# Patient Record
Sex: MALE | Race: WHITE | Employment: FULL TIME
[De-identification: names, ages, dates, MRNs, and addresses within clinical notes are randomized per-mention and may not be internally consistent; named-entity substitution may affect disease eponyms.]

---

## 2022-02-11 ENCOUNTER — TRANSCRIBE ORDERS (OUTPATIENT)
Dept: OTHER | Age: 37
End: 2022-02-11

## 2022-02-11 DIAGNOSIS — I71.03 DISSECTION OF THORACOABDOMINAL AORTA (H): Primary | ICD-10-CM

## 2022-02-21 ENCOUNTER — MEDICAL CORRESPONDENCE (OUTPATIENT)
Dept: HEALTH INFORMATION MANAGEMENT | Facility: CLINIC | Age: 37
End: 2022-02-21
Payer: COMMERCIAL

## 2022-02-25 ENCOUNTER — TRANSCRIBE ORDERS (OUTPATIENT)
Dept: OTHER | Age: 37
End: 2022-02-25

## 2022-02-25 DIAGNOSIS — I71.00 DISSECTION OF AORTA, UNSPECIFIED PORTION OF AORTA (H): Primary | ICD-10-CM

## 2022-03-01 ENCOUNTER — TELEPHONE (OUTPATIENT)
Dept: CONSULT | Facility: CLINIC | Age: 37
End: 2022-03-01
Payer: COMMERCIAL

## 2022-03-01 NOTE — TELEPHONE ENCOUNTER
[facility] Call Center    Phone Message    May a detailed message be left on voicemail: yes     Reason for Call: Other: Intake form     Patient called back and scheduled. He declined MyChart.  I have added an email address to the demographics in case the intake form needs to be emailed to him instead.     Action Taken: Message routed to:  Other: Peds Genetics    Travel Screening: Not Applicable

## 2022-03-01 NOTE — TELEPHONE ENCOUNTER
LVM for patient to call back to schedule new patient appointment with any available Genetics MD (excluding Dr. Garces) with GC visit prior. When patient calls back, please assist in scheduling appointment. If scheduling with Dr. Nguyễn, schedule in person visit, otherwise video or in person visit OK but please inform patient that appointment type may be changed at provider's discretion. Please advise patient to complete intake form via [employer]. Thank you.

## 2022-04-08 ENCOUNTER — TRANSCRIBE ORDERS (OUTPATIENT)
Dept: OTHER | Age: 37
End: 2022-04-08
Payer: COMMERCIAL

## 2022-04-08 DIAGNOSIS — Z98.890 S/P AORTIC DISSECTION REPAIR: Primary | ICD-10-CM

## 2022-04-11 ENCOUNTER — OFFICE VISIT (OUTPATIENT)
Dept: CONSULT | Facility: CLINIC | Age: 37
End: 2022-04-11
Attending: PEDIATRICS
Payer: COMMERCIAL

## 2022-04-11 VITALS
SYSTOLIC BLOOD PRESSURE: 167 MMHG | WEIGHT: 231.7 LBS | HEART RATE: 76 BPM | HEIGHT: 72 IN | BODY MASS INDEX: 31.38 KG/M2 | DIASTOLIC BLOOD PRESSURE: 102 MMHG

## 2022-04-11 DIAGNOSIS — I71.00 AORTIC DISSECTION (H): ICD-10-CM

## 2022-04-11 DIAGNOSIS — Z92.81 PERSONAL HISTORY OF ECMO: ICD-10-CM

## 2022-04-11 DIAGNOSIS — I71.00 DISSECTION OF AORTA, UNSPECIFIED PORTION OF AORTA (H): ICD-10-CM

## 2022-04-11 DIAGNOSIS — Z98.890 HX OF REPAIR OF DISSECTING ANEURYSM OF ASCENDING THORACIC AORTA: ICD-10-CM

## 2022-04-11 DIAGNOSIS — Z86.79 HX OF REPAIR OF DISSECTING ANEURYSM OF ASCENDING THORACIC AORTA: ICD-10-CM

## 2022-04-11 DIAGNOSIS — Z71.83 ENCOUNTER FOR NONPROCREATIVE GENETIC COUNSELING: Primary | ICD-10-CM

## 2022-04-11 PROCEDURE — G0463 HOSPITAL OUTPT CLINIC VISIT: HCPCS

## 2022-04-11 PROCEDURE — 99205 OFFICE O/P NEW HI 60 MIN: CPT | Performed by: PEDIATRICS

## 2022-04-11 PROCEDURE — 36415 COLL VENOUS BLD VENIPUNCTURE: CPT | Performed by: GENETIC COUNSELOR, MS

## 2022-04-11 PROCEDURE — 96040 HC GENETIC COUNSELING, EACH 30 MINUTES: CPT | Performed by: GENETIC COUNSELOR, MS

## 2022-04-11 RX ORDER — FERROUS SULFATE 325(65) MG
TABLET ORAL
COMMUNITY

## 2022-04-11 RX ORDER — ASPIRIN 81 MG/1
TABLET, CHEWABLE ORAL
COMMUNITY
Start: 2022-02-18

## 2022-04-11 RX ORDER — METOPROLOL TARTRATE 50 MG
TABLET ORAL
COMMUNITY
Start: 2022-03-17

## 2022-04-11 RX ORDER — APIXABAN 5 MG/1
TABLET, FILM COATED ORAL
COMMUNITY
Start: 2022-03-25

## 2022-04-11 RX ORDER — AMLODIPINE BESYLATE 5 MG/1
TABLET ORAL
COMMUNITY
Start: 2022-02-21

## 2022-04-11 RX ORDER — AMOXICILLIN 500 MG/1
CAPSULE ORAL
COMMUNITY
Start: 2022-02-23

## 2022-04-11 RX ORDER — AMIODARONE HYDROCHLORIDE 200 MG/1
200 TABLET ORAL
COMMUNITY
Start: 2022-03-15

## 2022-04-11 RX ORDER — LOSARTAN POTASSIUM 25 MG/1
TABLET ORAL
COMMUNITY
Start: 2022-04-06

## 2022-04-11 RX ORDER — ACETAMINOPHEN 80 MG/1
TABLET, CHEWABLE ORAL
COMMUNITY

## 2022-04-11 NOTE — PROGRESS NOTES
Name:  Vinny Russell  :   1985  MRN:   [id number]  Date of service: 2022  Primary Provider: Summer Trevino  Referring Provider: Summer Trevino    Presenting Information:  Vinny, a 36 year old male, was seen at the [facility] for evaluation of aortic dissection. I met with Vinny at the request of Dr. Lambert to obtain a family history, discuss possible genetic contributions to his symptoms, and to obtain informed consent for genetic testing.       Family History:   A three generation pedigree was obtained today and scanned into the EMR. 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 Refer to Dr. Lambert's note for a more detailed personal history.   Children:    Son (4) is well.    Vinny's partner is currently pregnant with a son. No prenatal complications noted.   Siblings:    Sister (42) has high cholesterol. She has 3 children who are well.  Maternal Relatives:    Mother (72) has high cholesterol and arthritis.    Uncle had heart bypass surgery.    Uncle had heart bypass surgery.    Grandmother passed away at age 98 from old age.     Grandfather passed away at age 50 from a heart attack.   Paternal Relatives:    Father (68) has high cholesterol and mental health concerns.    Aunt passed away at age 60 from skin cancer.    Aunt passed away at age 65 from substance dependency complications.    Aunt is getting treatment for a jaw cyst.    Grandmother passed away in her late 80s from Alzheimer's/old age.     Grandfather passed away at age 80 from a stroke. He had high cholesterol and diabetes.     The family history is  otherwise negative for aortic root dilation, aneurysm, high degree of myopia, ectopia lentis, retinal detachment, pneumothorax, scoliosis, other skeletal concerns, organ rupture, sudden death, and known genetic disorders. Consanguinity is denied.      Discussion and Assessment:  Genes are long stretches of DNA that are responsible for how our bodies look and how our bodies work.  Our genes are inherited on structures called chromosomes of which we have 23 pairs.  One copy of each chromosome in a pair is inherited from the mother and one is inherited from the father.  Changes in the DNA sequence of a gene, called mutations or pathogenic variants, as well as changes within the chromosomes can cause the signs and symptoms of a genetic condition.     Aortic dilation/aneurysm occurs when a portion of the aorta, a large blood vessel in the heart, becomes enlarged due to weakening of the artery wall.  Depending on the size of the aneurysm, there may be a risk for the aneurysm to break open and rupture, also known as an aortic dissection.  Aortic aneurysms can be caused by disruptions or mutations in a number of genes, which can be inherited within families.  Aortic aneurysms can be the only symptom caused by the gene disruption, such as in the genetic condition, familial thoracic aortic aneurysm and/or dissection (TAAD).  Aortic aneurysms can also occur as part of a syndrome that includes other symptoms or physical features.  These syndromes are known as connective tissue disorders because the genes involved make connective tissue proteins found in joints, organs, and blood vessels.  One of these syndromes is called Marfan syndrome.  In addition to aortic aneurysms, other features include tall stature, slender limbs, long arm span, scoliosis, and lens dislocation.  Examples of two other connective tissue disorders are Loeys-Rachel syndrome and Luis-Danlos syndrome. Aortic aneurysm can be found in association with genetic  syndromes, such as Marfan and Loeys-Rachel syndrome or as an isolated event that runs in families. 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    Genetic testing is available for thoracic aortic aneurysms. The testing panel includes 29 genes associated with both syndromic and isolated aortic aneurysms. Testing currently identifies pathogenic variants in about 20% of individuals with TAAD. Diagnostic yield is higher for syndromic individuals. The potential results were discussed including a positive, negative, or variant of uncertain significance.       One possibility is a change(s) could be seen in Vinny and this change(s) is known to cause similar symptoms to the symptoms Vinny has experienced.  This is considered a positive result.  A positive result may provide more information on appropriate clinical management for Vinny and may provide information on additional potential health risks associated with Vinny's diagnosis.  A positive result can also have implications for the health and reproductive risks of other relatives.    It is also possible that no change(s) that are likely to explain Vinny's symptoms are found.  This is considered a negative result.  A negative result would not completely rule out a possible genetic cause for Vinny's symptoms.    Not all changes in our genes cause disease.  Sometimes, it can be difficult for the laboratory to determine whether or not a change that is found contributes to the patient's symptoms.  If the meaning of a particular gene change is unknown, the lab classifies the result as a variant of unknown significance (VUS). Follow-up testing of relatives may be beneficial in clarifying the meaning of this result.     It is medically necessary to determine if there is an underlying genetic cause for Vinny's symptoms for several reasons. First and  foremost this can be important for his own health. It is possible that an underlying cause may also predispose Vinny to other health risks. Knowing about these additional health risks can help us stay ahead of Vinny's healthcare to more appropriately screen for other complications.  Some diagnoses may also have treatment options. Additionally, discovering an underlying reason may help predict the chance for other family members to have similar healthcare needs. Finally, having a specific underlying diagnosis can sometimes help individuals receive the services they need to help reach their full potential in school, in work, or in day to day life.     Insurance and billing procedures were covered with Vinny. The lab we are recommending using for this test is called [facility]. Once [facility] receives the sample, they will do a benefits investigation and contact Vinny with an estimated out of pocket cost if expected to be more than $100. This estimation is not guaranteed. At that time, Vinny has the right to decline to proceed with testing based on the benefits investigation. If [facility] does not hear back from Vinny after three attempts to connect, testing will be canceled. If the benefits investigation is too high for Vinny [facility] offers financial assistance based on house-hold income and household size. he may also switch to the patient-pay price. If the estimation of benefits is less than $100, Vinny will not be contacted and testing will be automatically initiated.     Vinny provided informed consent for the testing. I will plan to follow-up with Vinny by phone when results are returned, approximately 2-3 weeks after the testing is initiated. A follow-up appointment will be scheduled as needed according to Dr. Lambert. Additional questions or concerns were denied at this time.        Plan:  1. Vinny had his blood drawn for a Aortopathy Comprehensive Panel at [facility] today. Once the lab receives the sample,  they will conduct a benefits investigation with [email or web address] insurance and he will be contacted about the outcome.     2. If he want to proceed at that time, Vinny's genetic test will be initiated. If the estimation is less than $100, he will not be contacted and testing will begin automatically.    3. Results are expected in approximately 2-3 weeks and will be returned by phone; a follow-up appointment will be scheduled as needed at that time.    4. Contact information was provided should any questions arise in the future.        Esperanza Joyner MS, [facility]  Genetic Counselor  [facility]   Phone: [phone]        Approximate Time Spent in Consultation: 20 min     CC: no letter

## 2022-04-11 NOTE — LETTER
2022      RE: Vinny Russell  [address]  [place] [address]       Name:  Vinny Russell  :   1985  MRN:   [id number]  Date of service: 2022  Primary Provider: Summer Trevino  Referring Provider: Summer Trevino    Presenting Information:  Vinny, a 36 year old male, was seen at the [facility] for evaluation of aortic dissection. I met with Vinny at the request of Dr. Lambert to obtain a family history, discuss possible genetic contributions to his symptoms, and to obtain informed consent for genetic testing.       Family History:   A three generation pedigree was obtained today and scanned into the EMR. 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 Refer to Dr. Lambert's note for a more detailed personal history.   Children:    Son (4) is well.    Vinny's partner is currently pregnant with a son. No prenatal complications noted.   Siblings:    Sister (42) has high cholesterol. She has 3 children who are well.  Maternal Relatives:    Mother (72) has high cholesterol and arthritis.    Uncle had heart bypass surgery.    Uncle had heart bypass surgery.    Grandmother passed away at age 98 from old age.     Grandfather passed away at age 50 from a heart attack.   Paternal Relatives:    Father (68) has high cholesterol and mental health concerns.    Aunt passed away at age 60 from skin cancer.    Aunt passed away at age 65 from substance dependency complications.    Aunt is getting treatment for a jaw cyst.    Grandmother passed away in her late 80s from Alzheimer's/old age.     Grandfather passed away at age  80 from a stroke. He had high cholesterol and diabetes.     The family history is otherwise negative for aortic root dilation, aneurysm, high degree of myopia, ectopia lentis, retinal detachment, pneumothorax, scoliosis, other skeletal concerns, organ rupture, sudden death, and known genetic disorders. Consanguinity is denied.      Discussion and Assessment:  Genes are long stretches of DNA that are responsible for how our bodies look and how our bodies work.  Our genes are inherited on structures called chromosomes of which we have 23 pairs.  One copy of each chromosome in a pair is inherited from the mother and one is inherited from the father.  Changes in the DNA sequence of a gene, called mutations or pathogenic variants, as well as changes within the chromosomes can cause the signs and symptoms of a genetic condition.     Aortic dilation/aneurysm occurs when a portion of the aorta, a large blood vessel in the heart, becomes enlarged due to weakening of the artery wall.  Depending on the size of the aneurysm, there may be a risk for the aneurysm to break open and rupture, also known as an aortic dissection.  Aortic aneurysms can be caused by disruptions or mutations in a number of genes, which can be inherited within families.  Aortic aneurysms can be the only symptom caused by the gene disruption, such as in the genetic condition, familial thoracic aortic aneurysm and/or dissection (TAAD).  Aortic aneurysms can also occur as part of a syndrome that includes other symptoms or physical features.  These syndromes are known as connective tissue disorders because the genes involved make connective tissue proteins found in joints, organs, and blood vessels.  One of these syndromes is called Marfan syndrome.  In addition to aortic aneurysms, other features include tall stature, slender limbs, long arm span, scoliosis, and lens dislocation.  Examples of two other connective tissue disorders are Loeys-Rachel syndrome and  Luis-Danlos syndrome. Aortic aneurysm can be found in association with genetic syndromes, such as Marfan and Loeys-Rachel syndrome or as an isolated event that runs in families. 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    Genetic testing is available for thoracic aortic aneurysms. The testing panel includes 29 genes associated with both syndromic and isolated aortic aneurysms. Testing currently identifies pathogenic variants in about 20% of individuals with TAAD. Diagnostic yield is higher for syndromic individuals. The potential results were discussed including a positive, negative, or variant of uncertain significance.       One possibility is a change(s) could be seen in Vinny and this change(s) is known to cause similar symptoms to the symptoms Vinny has experienced.  This is considered a positive result.  A positive result may provide more information on appropriate clinical management for Vinny and may provide information on additional potential health risks associated with Vinny's diagnosis.  A positive result can also have implications for the health and reproductive risks of other relatives.    It is also possible that no change(s) that are likely to explain Vinny's symptoms are found.  This is considered a negative result.  A negative result would not completely rule out a possible genetic cause for Vinny's symptoms.    Not all changes in our genes cause disease.  Sometimes, it can be difficult for the laboratory to determine whether or not a change that is found contributes to the patient's symptoms.  If the meaning of a particular gene change is unknown, the lab classifies the result as a variant of unknown significance (VUS). Follow-up testing of relatives may be beneficial in clarifying the meaning of this result.     It is medically necessary to determine if there is an  underlying genetic cause for Vinny's symptoms for several reasons. First and foremost this can be important for his own health. It is possible that an underlying cause may also predispose Vinny to other health risks. Knowing about these additional health risks can help us stay ahead of Vinny's healthcare to more appropriately screen for other complications.  Some diagnoses may also have treatment options. Additionally, discovering an underlying reason may help predict the chance for other family members to have similar healthcare needs. Finally, having a specific underlying diagnosis can sometimes help individuals receive the services they need to help reach their full potential in school, in work, or in day to day life.     Insurance and billing procedures were covered with Vinny. The lab we are recommending using for this test is called [facility]. Once [facility] receives the sample, they will do a benefits investigation and contact Vinny with an estimated out of pocket cost if expected to be more than $100. This estimation is not guaranteed. At that time, Vinny has the right to decline to proceed with testing based on the benefits investigation. If Pete does not hear back from Vinny after three attempts to connect, testing will be canceled. If the benefits investigation is too high for Vinny [facility] offers financial assistance based on house-hold income and household size. he may also switch to the patient-pay price. If the estimation of benefits is less than $100, Vinny will not be contacted and testing will be automatically initiated.     Vinny provided informed consent for the testing. I will plan to follow-up with Vinny by phone when results are returned, approximately 2-3 weeks after the testing is initiated. A follow-up appointment will be scheduled as needed according to Dr. Lambert. Additional questions or concerns were denied at this time.        Plan:  1. Vinny had his blood drawn for a  Aortopathy Comprehensive Panel at [facility] today. Once the lab receives the sample, they will conduct a benefits investigation with [employer] insurance and he will be contacted about the outcome.     2. If he want to proceed at that time, Vinyn's genetic test will be initiated. If the estimation is less than $100, he will not be contacted and testing will begin automatically.    3. Results are expected in approximately 2-3 weeks and will be returned by phone; a follow-up appointment will be scheduled as needed at that time.    4. Contact information was provided should any questions arise in the future.        Esperanza Joyner MS, [facility]  Genetic Counselor  [facility]   Phone: [phone]        Approximate Time Spent in Consultation: 20 min     CC: no letter      Esperanza Joyner

## 2022-04-11 NOTE — NURSING NOTE
"Chief Complaint   Patient presents with     Consult     S/P aortic dissection        Pulse 74   Ht 5' 11.89\" (182.6 cm)   Wt 231 lb 11.3 oz (105.1 kg)   HC 60.6 cm (23.86\")   BMI 31.52 kg/m      Concha Bryan  April 11, 2022  "

## 2022-04-11 NOTE — PATIENT INSTRUCTIONS
Genetics  [facility]     Contact our nurse care coordinator Joselin ROLANDN, RN, PHN at [phone] or send a [employer] message for any non-urgent general or medical questions.     If you had genetic testing and have further questions, please contact the genetic counselor:    Esperanza Joyner  Ph: [phone]    To schedule appointments:  Pediatric Call Center for [facility]: [phone]  Neuropsychology [phone]   Radiology/ Imaging/Echocardiogram: [phone]   Services:   [phone]     You should receive a phone call about your next appointment. If you do not receive this within two weeks of your visit, please call [phone].     IF REFERRALS WERE PLACED/ DISCUSSED DURING THE VISIT, PLEASE LET OUR TEAM KNOW IF YOU DO NOT HEAR FROM THE SCHEDULERS IN 2 WEEKS    If you have not already done so consider signing up for appsplit by speaking with the person at the  on your way out or go to [email or web address] to sign up online.     appsplit enables easy and confidential communication with your care team.

## 2022-04-11 NOTE — LETTER
Date:April 13, 2022      Provider requested that no letter be sent. Do not send.       [facility]
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
## 2022-04-12 PROBLEM — Z92.81 PERSONAL HISTORY OF ECMO: Status: ACTIVE | Noted: 2022-04-12

## 2022-04-12 PROBLEM — I71.00 AORTIC DISSECTION (H): Status: ACTIVE | Noted: 2022-01-03

## 2022-04-12 PROBLEM — I48.92 ATRIAL FLUTTER (H): Status: ACTIVE | Noted: 2022-01-30

## 2022-04-12 PROBLEM — I10 HTN (HYPERTENSION): Status: ACTIVE | Noted: 2022-02-21

## 2022-04-12 PROBLEM — Z98.890: Status: ACTIVE | Noted: 2022-04-12

## 2022-04-12 PROBLEM — Z86.79: Status: ACTIVE | Noted: 2022-04-12

## 2022-04-12 PROBLEM — I63.9 CEREBROVASCULAR ACCIDENT (CVA) DUE TO EMBOLISM (H): Status: ACTIVE | Noted: 2022-02-08
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
## 2022-04-20 ENCOUNTER — TELEPHONE (OUTPATIENT)
Dept: CONSULT | Facility: CLINIC | Age: 37
End: 2022-04-20
Payer: COMMERCIAL

## 2022-04-20 LAB — SCANNED LAB RESULT: NORMAL

## 2022-04-20 NOTE — LETTER
TO: Vinny Russell  [address] [facility] [address]       April 20, 2022      Dear Vinny,    This letter is being provided as a summary of your recent genetic testing results. I have also included a copy of the testing report for your own records.     Your Aortopathy Panel was completed at [facility]. These results were completely negative/normal. No disease-causing or uncertain changes were identified in the 29 genes analyzed. This result rules out many potential genetic causes for your aortic dissection. However, it is still possible that your health concerns are due to a genetic factor not analyzed by this particular test.    Dr. Lambert would like to see you for a follow up visit in approximately one year or earlier if new concerns come up in you personal or family history. You can call [phone] to schedule the follow-up visit as it gets closer. You are encouraged to reach out to us if questions come up about these results in the meantime.       Sincerely,    Esperanza Joyner MS, [facility]  Genetic Counselor  [facility]  Phone: [phone]

## 2022-04-20 NOTE — TELEPHONE ENCOUNTER
I called Vinny to discuss the results of his genetic testing.    Vinny's Aortopathy Panel was completed at [facility]. These results were completely negative/normal. No disease-causing or uncertain changes were identified in the 29 genes analyzed. This result rules out many potential genetic causes for Vinny's aortic dissection. However, it is still possible that his health concerns are due to a genetic factor not analyzed by this particular test.    Dr. Lambert would like to see Vinny for a follow up visit in approximately one year or earlier if new concerns come up in his personal or family history. Vinny is encouraged to reach out to us if questions come up about these results in the meantime. I will send a copy of the report to Vinny for his own records.      Esperanza Joyner MS, [facility]  Genetic Counselor  [facility]  Phone: [phone]

## 2022-04-26 ENCOUNTER — HOSPITAL ENCOUNTER (OUTPATIENT)
Dept: CARDIAC REHAB | Facility: CLINIC | Age: 37
Discharge: HOME | End: 2022-04-26
Attending: INTERNAL MEDICINE
Payer: COMMERCIAL

## 2022-04-26 DIAGNOSIS — Z98.890 S/P AORTIC DISSECTION REPAIR: ICD-10-CM

## 2022-04-26 PROCEDURE — 93798 PHYS/QHP OP CAR RHAB W/ECG: CPT

## 2022-04-27 ENCOUNTER — HOSPITAL ENCOUNTER (OUTPATIENT)
Dept: CARDIAC REHAB | Facility: CLINIC | Age: 37
Discharge: HOME | End: 2022-04-27
Attending: INTERNAL MEDICINE
Payer: COMMERCIAL

## 2022-04-27 PROCEDURE — 93798 PHYS/QHP OP CAR RHAB W/ECG: CPT | Performed by: REHABILITATION PRACTITIONER

## 2022-04-29 ENCOUNTER — HOSPITAL ENCOUNTER (OUTPATIENT)
Dept: CARDIAC REHAB | Facility: CLINIC | Age: 37
Discharge: HOME | End: 2022-04-29
Attending: INTERNAL MEDICINE
Payer: COMMERCIAL

## 2022-04-29 PROCEDURE — 93798 PHYS/QHP OP CAR RHAB W/ECG: CPT | Performed by: CLINICAL EXERCISE PHYSIOLOGIST

## 2022-05-02 ENCOUNTER — HOSPITAL ENCOUNTER (OUTPATIENT)
Dept: CARDIAC REHAB | Facility: CLINIC | Age: 37
Discharge: HOME | End: 2022-05-02
Attending: INTERNAL MEDICINE
Payer: COMMERCIAL

## 2022-05-02 PROCEDURE — 93798 PHYS/QHP OP CAR RHAB W/ECG: CPT | Performed by: OCCUPATIONAL THERAPIST

## 2022-05-05 ENCOUNTER — HOSPITAL ENCOUNTER (OUTPATIENT)
Dept: CARDIAC REHAB | Facility: CLINIC | Age: 37
Discharge: HOME | End: 2022-05-05
Attending: INTERNAL MEDICINE
Payer: COMMERCIAL

## 2022-05-05 PROCEDURE — 93798 PHYS/QHP OP CAR RHAB W/ECG: CPT | Performed by: REHABILITATION PRACTITIONER

## 2022-05-06 ENCOUNTER — HOSPITAL ENCOUNTER (OUTPATIENT)
Dept: CARDIAC REHAB | Facility: CLINIC | Age: 37
Discharge: HOME | End: 2022-05-06
Attending: INTERNAL MEDICINE
Payer: COMMERCIAL

## 2022-05-06 PROCEDURE — 93798 PHYS/QHP OP CAR RHAB W/ECG: CPT | Performed by: REHABILITATION PRACTITIONER

## 2022-05-09 ENCOUNTER — HOSPITAL ENCOUNTER (OUTPATIENT)
Dept: CARDIAC REHAB | Facility: CLINIC | Age: 37
Discharge: HOME | End: 2022-05-09
Attending: INTERNAL MEDICINE
Payer: COMMERCIAL

## 2022-05-09 PROCEDURE — 93798 PHYS/QHP OP CAR RHAB W/ECG: CPT

## 2022-05-11 ENCOUNTER — HOSPITAL ENCOUNTER (OUTPATIENT)
Dept: CARDIAC REHAB | Facility: CLINIC | Age: 37
Discharge: HOME | End: 2022-05-11
Attending: INTERNAL MEDICINE
Payer: COMMERCIAL

## 2022-05-11 PROCEDURE — 93798 PHYS/QHP OP CAR RHAB W/ECG: CPT | Performed by: REHABILITATION PRACTITIONER

## 2022-05-13 ENCOUNTER — HOSPITAL ENCOUNTER (OUTPATIENT)
Dept: CARDIAC REHAB | Facility: CLINIC | Age: 37
Discharge: HOME | End: 2022-05-13
Attending: INTERNAL MEDICINE
Payer: COMMERCIAL

## 2022-05-13 PROCEDURE — 93798 PHYS/QHP OP CAR RHAB W/ECG: CPT | Performed by: CLINICAL EXERCISE PHYSIOLOGIST

## 2022-05-16 ENCOUNTER — HOSPITAL ENCOUNTER (OUTPATIENT)
Dept: CARDIAC REHAB | Facility: CLINIC | Age: 37
Discharge: HOME | End: 2022-05-16
Attending: INTERNAL MEDICINE
Payer: COMMERCIAL

## 2022-05-16 PROCEDURE — 93798 PHYS/QHP OP CAR RHAB W/ECG: CPT | Performed by: REHABILITATION PRACTITIONER

## 2022-05-18 ENCOUNTER — HOSPITAL ENCOUNTER (OUTPATIENT)
Dept: CARDIAC REHAB | Facility: CLINIC | Age: 37
Discharge: HOME | End: 2022-05-18
Attending: INTERNAL MEDICINE
Payer: COMMERCIAL

## 2022-05-18 PROCEDURE — 93798 PHYS/QHP OP CAR RHAB W/ECG: CPT | Performed by: REHABILITATION PRACTITIONER

## 2022-05-20 ENCOUNTER — HOSPITAL ENCOUNTER (OUTPATIENT)
Dept: CARDIAC REHAB | Facility: CLINIC | Age: 37
Discharge: HOME | End: 2022-05-20
Attending: INTERNAL MEDICINE
Payer: COMMERCIAL

## 2022-05-20 PROCEDURE — 93798 PHYS/QHP OP CAR RHAB W/ECG: CPT

## 2022-05-23 ENCOUNTER — HOSPITAL ENCOUNTER (OUTPATIENT)
Dept: CARDIAC REHAB | Facility: CLINIC | Age: 37
Discharge: HOME | End: 2022-05-23
Attending: INTERNAL MEDICINE
Payer: COMMERCIAL

## 2022-05-23 PROCEDURE — 93798 PHYS/QHP OP CAR RHAB W/ECG: CPT | Performed by: REHABILITATION PRACTITIONER

## 2022-05-25 ENCOUNTER — HOSPITAL ENCOUNTER (OUTPATIENT)
Dept: CARDIAC REHAB | Facility: CLINIC | Age: 37
Discharge: HOME | End: 2022-05-25
Attending: INTERNAL MEDICINE
Payer: COMMERCIAL

## 2022-05-25 PROCEDURE — 93798 PHYS/QHP OP CAR RHAB W/ECG: CPT | Performed by: REHABILITATION PRACTITIONER

## 2022-05-26 ENCOUNTER — HOSPITAL ENCOUNTER (OUTPATIENT)
Dept: CARDIAC REHAB | Facility: CLINIC | Age: 37
Discharge: HOME | End: 2022-05-26
Attending: INTERNAL MEDICINE
Payer: COMMERCIAL

## 2022-05-26 PROCEDURE — 93798 PHYS/QHP OP CAR RHAB W/ECG: CPT | Performed by: REHABILITATION PRACTITIONER

## 2022-05-29 ENCOUNTER — HEALTH MAINTENANCE LETTER (OUTPATIENT)
Age: 37
End: 2022-05-29

## 2022-05-31 ENCOUNTER — HOSPITAL ENCOUNTER (OUTPATIENT)
Dept: CARDIAC REHAB | Facility: CLINIC | Age: 37
Discharge: HOME | End: 2022-05-31
Attending: INTERNAL MEDICINE
Payer: COMMERCIAL

## 2022-05-31 PROCEDURE — 93798 PHYS/QHP OP CAR RHAB W/ECG: CPT

## 2022-05-31 PROCEDURE — G0463 HOSPITAL OUTPT CLINIC VISIT: HCPCS

## 2022-06-01 ENCOUNTER — HOSPITAL ENCOUNTER (OUTPATIENT)
Dept: CARDIAC REHAB | Facility: CLINIC | Age: 37
Discharge: HOME | End: 2022-06-01
Attending: INTERNAL MEDICINE
Payer: COMMERCIAL

## 2022-06-01 PROCEDURE — 93798 PHYS/QHP OP CAR RHAB W/ECG: CPT

## 2022-06-06 ENCOUNTER — HOSPITAL ENCOUNTER (OUTPATIENT)
Dept: CARDIAC REHAB | Facility: CLINIC | Age: 37
Discharge: HOME | End: 2022-06-06
Attending: INTERNAL MEDICINE
Payer: COMMERCIAL

## 2022-06-06 PROCEDURE — 93798 PHYS/QHP OP CAR RHAB W/ECG: CPT | Performed by: REHABILITATION PRACTITIONER

## 2022-06-10 ENCOUNTER — HOSPITAL ENCOUNTER (OUTPATIENT)
Dept: CARDIAC REHAB | Facility: CLINIC | Age: 37
Discharge: HOME | End: 2022-06-10
Attending: INTERNAL MEDICINE
Payer: COMMERCIAL

## 2022-06-10 PROCEDURE — 93798 PHYS/QHP OP CAR RHAB W/ECG: CPT | Performed by: REHABILITATION PRACTITIONER

## 2022-06-13 ENCOUNTER — HOSPITAL ENCOUNTER (OUTPATIENT)
Dept: CARDIAC REHAB | Facility: CLINIC | Age: 37
Discharge: HOME | End: 2022-06-13
Attending: INTERNAL MEDICINE
Payer: COMMERCIAL

## 2022-06-13 PROCEDURE — 93798 PHYS/QHP OP CAR RHAB W/ECG: CPT | Performed by: REHABILITATION PRACTITIONER

## 2022-06-15 ENCOUNTER — HOSPITAL ENCOUNTER (OUTPATIENT)
Dept: CARDIAC REHAB | Facility: CLINIC | Age: 37
Discharge: HOME | End: 2022-06-15
Attending: INTERNAL MEDICINE
Payer: COMMERCIAL

## 2022-06-15 PROCEDURE — 93798 PHYS/QHP OP CAR RHAB W/ECG: CPT | Performed by: REHABILITATION PRACTITIONER

## 2022-06-17 ENCOUNTER — HOSPITAL ENCOUNTER (OUTPATIENT)
Dept: CARDIAC REHAB | Facility: CLINIC | Age: 37
Discharge: HOME | End: 2022-06-17
Attending: INTERNAL MEDICINE
Payer: COMMERCIAL

## 2022-06-17 PROCEDURE — 93798 PHYS/QHP OP CAR RHAB W/ECG: CPT | Performed by: CLINICAL EXERCISE PHYSIOLOGIST

## 2022-06-20 ENCOUNTER — HOSPITAL ENCOUNTER (OUTPATIENT)
Dept: CARDIAC REHAB | Facility: CLINIC | Age: 37
Discharge: HOME | End: 2022-06-20
Attending: INTERNAL MEDICINE
Payer: COMMERCIAL

## 2022-06-20 PROCEDURE — 93798 PHYS/QHP OP CAR RHAB W/ECG: CPT | Performed by: REHABILITATION PRACTITIONER

## 2022-06-22 ENCOUNTER — HOSPITAL ENCOUNTER (OUTPATIENT)
Dept: CARDIAC REHAB | Facility: CLINIC | Age: 37
Discharge: HOME | End: 2022-06-22
Attending: INTERNAL MEDICINE
Payer: COMMERCIAL

## 2022-06-22 PROCEDURE — 93798 PHYS/QHP OP CAR RHAB W/ECG: CPT | Performed by: REHABILITATION PRACTITIONER

## 2022-10-03 ENCOUNTER — HEALTH MAINTENANCE LETTER (OUTPATIENT)
Age: 37
End: 2022-10-03

## 2023-06-04 ENCOUNTER — HEALTH MAINTENANCE LETTER (OUTPATIENT)
Age: 38
End: 2023-06-04

## 2024-07-28 ENCOUNTER — HEALTH MAINTENANCE LETTER (OUTPATIENT)
Age: 39
End: 2024-07-28